# Patient Record
Sex: FEMALE | Race: BLACK OR AFRICAN AMERICAN | NOT HISPANIC OR LATINO | URBAN - METROPOLITAN AREA
[De-identification: names, ages, dates, MRNs, and addresses within clinical notes are randomized per-mention and may not be internally consistent; named-entity substitution may affect disease eponyms.]

---

## 2024-11-25 ENCOUNTER — APPOINTMENT (OUTPATIENT)
Dept: URBAN - METROPOLITAN AREA CLINIC 276 | Age: 34
Setting detail: DERMATOLOGY
End: 2024-12-02

## 2024-11-25 DIAGNOSIS — L92.3 FOREIGN BODY GRANULOMA OF THE SKIN AND SUBCUTANEOUS TISSUE: ICD-10-CM

## 2024-11-25 PROCEDURE — OTHER FOREIGN BODY REMOVAL: OTHER

## 2024-11-25 PROCEDURE — OTHER MIPS QUALITY: OTHER

## 2024-11-25 PROCEDURE — 10120 INC&RMVL FB SUBQ TISS SMPL: CPT

## 2024-11-25 ASSESSMENT — LOCATION ZONE DERM: LOCATION ZONE: EAR

## 2024-11-25 ASSESSMENT — LOCATION DETAILED DESCRIPTION DERM: LOCATION DETAILED: LEFT SCAPHA

## 2024-11-25 ASSESSMENT — LOCATION SIMPLE DESCRIPTION DERM: LOCATION SIMPLE: LEFT EAR

## 2024-11-25 NOTE — HPI: SCAR (KELOIDS)
Is This A New Presentation, Or A Follow-Up?: Follow Up Keloids
Additional History: Patient got a piercing. Scar tissue has formed on top of the piercing. Patient would like piercing removed and keloid treated.

## 2024-11-25 NOTE — PROCEDURE: FOREIGN BODY REMOVAL
Anesthesia Type: 1% lidocaine with epinephrine
Removal Method: forceps
Detail Level: Detailed
Bill For Simple Or Complicated Fb Removal?: simple
Incision Method: an 11 blade
Anesthesia Volume In Cc: 0.5